# Patient Record
Sex: FEMALE | Race: WHITE | URBAN - METROPOLITAN AREA
[De-identification: names, ages, dates, MRNs, and addresses within clinical notes are randomized per-mention and may not be internally consistent; named-entity substitution may affect disease eponyms.]

---

## 2018-11-26 ENCOUNTER — HOSPITAL ENCOUNTER (OUTPATIENT)
Dept: LAB | Age: 48
Discharge: HOME | End: 2018-11-26

## 2018-11-26 LAB — T-SPOT TB TEST (EMPLOYEE),XTBE: NORMAL

## 2018-11-26 PROCEDURE — 36415 COLL VENOUS BLD VENIPUNCTURE: CPT

## 2020-12-04 ENCOUNTER — IMPORTED ENCOUNTER (OUTPATIENT)
Dept: URBAN - METROPOLITAN AREA CLINIC 1 | Facility: CLINIC | Age: 50
End: 2020-12-04

## 2020-12-04 PROBLEM — H52.13: Noted: 2020-12-04

## 2020-12-04 PROBLEM — H52.223: Noted: 2020-12-04

## 2020-12-04 PROBLEM — H52.4: Noted: 2020-12-04

## 2020-12-04 PROCEDURE — 92004 COMPRE OPH EXAM NEW PT 1/>: CPT

## 2020-12-04 PROCEDURE — 92015 DETERMINE REFRACTIVE STATE: CPT

## 2020-12-04 NOTE — PATIENT DISCUSSION
1. Myopia: Rx was given for correction if indicated and requested. 2. Astigmatism OU3. Presbyopia4. Cataract OU Return for an appointment in 1 yr 36 with Dr. Patricia Mathew.

## 2022-04-02 ASSESSMENT — TONOMETRY
OD_IOP_MMHG: 15
OS_IOP_MMHG: 13

## 2022-04-02 ASSESSMENT — VISUAL ACUITY
OD_CC: J1+
OD_SC: 20/20
OS_CC: J1+
OS_SC: 20/20